# Patient Record
Sex: FEMALE | Race: WHITE | NOT HISPANIC OR LATINO | Employment: FULL TIME | URBAN - METROPOLITAN AREA
[De-identification: names, ages, dates, MRNs, and addresses within clinical notes are randomized per-mention and may not be internally consistent; named-entity substitution may affect disease eponyms.]

---

## 2023-11-28 ENCOUNTER — PATIENT OUTREACH (OUTPATIENT)
Dept: HEMATOLOGY/ONCOLOGY | Facility: HOSPITAL | Age: 23
End: 2023-11-28
Payer: COMMERCIAL

## 2023-11-28 NOTE — PROGRESS NOTES
Patient called asking to see Dr. Stephenson. She said that her cousin Shelby Richardson sees him. Patient said that she is an Ashkenazi Zoroastrian with a very low ferritin. She states that it trends downward. She also said that it is not due to blood loss because she has not had a period in one year. Trying to obtain records from [facility].

## 2023-12-06 ENCOUNTER — OFFICE VISIT (OUTPATIENT)
Dept: HEMATOLOGY/ONCOLOGY | Facility: HOSPITAL | Age: 23
End: 2023-12-06
Payer: COMMERCIAL

## 2023-12-06 ENCOUNTER — LAB (OUTPATIENT)
Dept: LAB | Facility: HOSPITAL | Age: 23
End: 2023-12-06
Payer: COMMERCIAL

## 2023-12-06 VITALS
TEMPERATURE: 97.9 F | RESPIRATION RATE: 16 BRPM | WEIGHT: 154.76 LBS | HEART RATE: 98 BPM | SYSTOLIC BLOOD PRESSURE: 118 MMHG | OXYGEN SATURATION: 100 % | BODY MASS INDEX: 26.42 KG/M2 | HEIGHT: 64 IN | DIASTOLIC BLOOD PRESSURE: 74 MMHG

## 2023-12-06 DIAGNOSIS — D50.9 IRON DEFICIENCY ANEMIA, UNSPECIFIED IRON DEFICIENCY ANEMIA TYPE: ICD-10-CM

## 2023-12-06 DIAGNOSIS — D50.9 IRON DEFICIENCY ANEMIA, UNSPECIFIED IRON DEFICIENCY ANEMIA TYPE: Primary | ICD-10-CM

## 2023-12-06 LAB
ALBUMIN SERPL BCP-MCNC: 4.8 G/DL (ref 3.4–5)
ALP SERPL-CCNC: 63 U/L (ref 33–110)
ALT SERPL W P-5'-P-CCNC: 19 U/L (ref 7–45)
ANION GAP SERPL CALC-SCNC: 13 MMOL/L (ref 10–20)
AST SERPL W P-5'-P-CCNC: 24 U/L (ref 9–39)
BASOPHILS # BLD AUTO: 0.04 X10*3/UL (ref 0–0.1)
BASOPHILS NFR BLD AUTO: 0.7 %
BILIRUB DIRECT SERPL-MCNC: 0.1 MG/DL (ref 0–0.3)
BILIRUB SERPL-MCNC: 0.6 MG/DL (ref 0–1.2)
BUN SERPL-MCNC: 12 MG/DL (ref 6–23)
CALCIUM SERPL-MCNC: 9.8 MG/DL (ref 8.6–10.6)
CHLORIDE SERPL-SCNC: 102 MMOL/L (ref 98–107)
CO2 SERPL-SCNC: 28 MMOL/L (ref 21–32)
CREAT SERPL-MCNC: 0.84 MG/DL (ref 0.5–1.05)
EOSINOPHIL # BLD AUTO: 0.05 X10*3/UL (ref 0–0.7)
EOSINOPHIL NFR BLD AUTO: 0.9 %
ERYTHROCYTE [DISTWIDTH] IN BLOOD BY AUTOMATED COUNT: 13.5 % (ref 11.5–14.5)
ERYTHROCYTE [SEDIMENTATION RATE] IN BLOOD BY WESTERGREN METHOD: 3 MM/H (ref 0–20)
FERRITIN SERPL-MCNC: 8 NG/ML (ref 8–150)
GFR SERPL CREATININE-BSD FRML MDRD: >90 ML/MIN/1.73M*2
GLUCOSE SERPL-MCNC: 80 MG/DL (ref 74–99)
HCT VFR BLD AUTO: 33.6 % (ref 36–46)
HGB BLD-MCNC: 10.5 G/DL (ref 12–16)
HGB RETIC QN: 27 PG (ref 28–38)
IMM GRANULOCYTES # BLD AUTO: 0.01 X10*3/UL (ref 0–0.7)
IMM GRANULOCYTES NFR BLD AUTO: 0.2 % (ref 0–0.9)
IMMATURE RETIC FRACTION: 9.4 %
IRON SATN MFR SERPL: ABNORMAL %
IRON SERPL-MCNC: 31 UG/DL (ref 35–150)
LDH SERPL L TO P-CCNC: 192 U/L (ref 84–246)
LYMPHOCYTES # BLD AUTO: 1.59 X10*3/UL (ref 1.2–4.8)
LYMPHOCYTES NFR BLD AUTO: 28.8 %
MCH RBC QN AUTO: 26.5 PG (ref 26–34)
MCHC RBC AUTO-ENTMCNC: 31.3 G/DL (ref 32–36)
MCV RBC AUTO: 85 FL (ref 80–100)
MONOCYTES # BLD AUTO: 0.32 X10*3/UL (ref 0.1–1)
MONOCYTES NFR BLD AUTO: 5.8 %
NEUTROPHILS # BLD AUTO: 3.51 X10*3/UL (ref 1.2–7.7)
NEUTROPHILS NFR BLD AUTO: 63.6 %
NRBC BLD-RTO: 0 /100 WBCS (ref 0–0)
PLATELET # BLD AUTO: 238 X10*3/UL (ref 150–450)
POTASSIUM SERPL-SCNC: 4.1 MMOL/L (ref 3.5–5.3)
PROT SERPL-MCNC: 7.6 G/DL (ref 6.4–8.2)
RBC # BLD AUTO: 3.96 X10*6/UL (ref 4–5.2)
RETICS #: 0.06 X10*6/UL (ref 0.02–0.08)
RETICS/RBC NFR AUTO: 1.4 % (ref 0.5–2)
SODIUM SERPL-SCNC: 139 MMOL/L (ref 136–145)
TIBC SERPL-MCNC: ABNORMAL UG/DL
TSH SERPL-ACNC: 1.89 MIU/L (ref 0.44–3.98)
UIBC SERPL-MCNC: >450 UG/DL (ref 110–370)
WBC # BLD AUTO: 5.5 X10*3/UL (ref 4.4–11.3)

## 2023-12-06 PROCEDURE — 85045 AUTOMATED RETICULOCYTE COUNT: CPT

## 2023-12-06 PROCEDURE — 80053 COMPREHEN METABOLIC PANEL: CPT

## 2023-12-06 PROCEDURE — 85025 COMPLETE CBC W/AUTO DIFF WBC: CPT

## 2023-12-06 PROCEDURE — 83615 LACTATE (LD) (LDH) ENZYME: CPT

## 2023-12-06 PROCEDURE — 83550 IRON BINDING TEST: CPT

## 2023-12-06 PROCEDURE — 1036F TOBACCO NON-USER: CPT | Performed by: INTERNAL MEDICINE

## 2023-12-06 PROCEDURE — 83540 ASSAY OF IRON: CPT

## 2023-12-06 PROCEDURE — 99204 OFFICE O/P NEW MOD 45 MIN: CPT | Performed by: INTERNAL MEDICINE

## 2023-12-06 PROCEDURE — 82728 ASSAY OF FERRITIN: CPT

## 2023-12-06 PROCEDURE — 85652 RBC SED RATE AUTOMATED: CPT

## 2023-12-06 PROCEDURE — 83020 HEMOGLOBIN ELECTROPHORESIS: CPT | Performed by: PATHOLOGY

## 2023-12-06 PROCEDURE — 99214 OFFICE O/P EST MOD 30 MIN: CPT | Mod: 25 | Performed by: INTERNAL MEDICINE

## 2023-12-06 PROCEDURE — 84443 ASSAY THYROID STIM HORMONE: CPT

## 2023-12-06 PROCEDURE — 82248 BILIRUBIN DIRECT: CPT

## 2023-12-06 PROCEDURE — 36415 COLL VENOUS BLD VENIPUNCTURE: CPT

## 2023-12-06 PROCEDURE — 83010 ASSAY OF HAPTOGLOBIN QUANT: CPT

## 2023-12-06 PROCEDURE — 83021 HEMOGLOBIN CHROMOTOGRAPHY: CPT

## 2023-12-06 RX ORDER — ONDANSETRON HYDROCHLORIDE 8 MG/1
8 TABLET, FILM COATED ORAL EVERY 8 HOURS PRN
COMMUNITY

## 2023-12-06 RX ORDER — PYRIDOSTIGMINE BROMIDE 60 MG/1
60 TABLET ORAL
COMMUNITY

## 2023-12-06 RX ORDER — DEXLANSOPRAZOLE 60 MG/1
60 CAPSULE, DELAYED RELEASE ORAL DAILY
COMMUNITY

## 2023-12-06 RX ORDER — AMITRIPTYLINE HYDROCHLORIDE 25 MG/1
25 TABLET, FILM COATED ORAL NIGHTLY
COMMUNITY

## 2023-12-06 RX ORDER — PROMETHAZINE HYDROCHLORIDE 25 MG/1
25 TABLET ORAL EVERY 6 HOURS PRN
COMMUNITY

## 2023-12-06 ASSESSMENT — COLUMBIA-SUICIDE SEVERITY RATING SCALE - C-SSRS
1. IN THE PAST MONTH, HAVE YOU WISHED YOU WERE DEAD OR WISHED YOU COULD GO TO SLEEP AND NOT WAKE UP?: NO
2. HAVE YOU ACTUALLY HAD ANY THOUGHTS OF KILLING YOURSELF?: NO
6. HAVE YOU EVER DONE ANYTHING, STARTED TO DO ANYTHING, OR PREPARED TO DO ANYTHING TO END YOUR LIFE?: NO

## 2023-12-06 ASSESSMENT — PATIENT HEALTH QUESTIONNAIRE - PHQ9
1. LITTLE INTEREST OR PLEASURE IN DOING THINGS: NOT AT ALL
SUM OF ALL RESPONSES TO PHQ9 QUESTIONS 1 AND 2: 0
2. FEELING DOWN, DEPRESSED OR HOPELESS: NOT AT ALL

## 2023-12-06 ASSESSMENT — ENCOUNTER SYMPTOMS
LOSS OF SENSATION IN FEET: 0
OCCASIONAL FEELINGS OF UNSTEADINESS: 0
DEPRESSION: 0

## 2023-12-06 ASSESSMENT — PAIN SCALES - GENERAL: PAINLEVEL: 0-NO PAIN

## 2023-12-06 NOTE — PROGRESS NOTES
Hematology office visit      Reason for visit : Evaluation for iron deficiency anaemia and low ferritin     HPI:   Herminia comes in today for evaluation of anaemia which she says was first noted in 2018  at that time she was treated with one / two infusion of IV iron which increased her ferritin levels however she was concerned her TIBC never normalized . Initially her iron deficiency was attributed to blood loss from menses. However,  she said her menses was never heavy , menarche at age 11 and periods were always irregular . She was started on Depot to normalize her periods, and then she had irregular spotting , when it was time to replace her Depot she developed amenorrhea and has not had  a period for a year. Sees GYN and even had progesterone challenge which induced withdrawal bleed , but after this she did not resume normal menses .    She has also seen GI , recent EGD was 2 days ago , has had many C scopes due to family h/o crohn's in her sister which did not show a GI source of bleed. She also said she was briefly on oral iron but could not tolerate  due to GI side effects which she chose not to elaborate , just said her underlying POTS made it difficult for hr to be on oral iron . She also sees Neurology , rheumatology and Endocrinology.     Her main concern today is that her 3rd cousin who is also a pt of Dr. Stephenson had been told she may have rare genetic blood disorder and she had been tested for it , Herminia believes she may also have a similar genetic disorder due to her being of Ashkenazi - [place] descent  and she would like to know what is causing her low ferritin level despite her not having heavy menses or GI bleed.    Admits to having excessive fatigue & brain fog that she attributes to low ferritin . Denied Pica .     No family history of anaemia , mother did have hysterectomy from fibroids and menorrhagia secondary to that .    Employed , non smoker , drinks alcohol 1-2 jaycee on weekdays ,  approx 4-5 over weekends, no recreational drug use      Past Medical History:   Diagnosis Date    Encounter for general adult medical examination without abnormal findings     Encounter for preventive health examination    Personal history of other diseases of the digestive system 03/31/2017    History of esophageal reflux    Postural orthostatic tachycardia syndrome (POTS)     POTS (postural orthostatic tachycardia syndrome)         Past Surgical History:   Procedure Laterality Date    APPENDECTOMY  02/23/2017    Appendectomy    CHOLECYSTECTOMY  03/31/2017    Cholecystectomy       No family history on file.    Social History     Socioeconomic History    Marital status: Single     Spouse name: Not on file    Number of children: Not on file    Years of education: Not on file    Highest education level: Not on file   Occupational History    Not on file   Tobacco Use    Smoking status: Never     Passive exposure: Never    Smokeless tobacco: Never   Substance and Sexual Activity    Alcohol use: Not Currently    Drug use: Never    Sexual activity: Not on file   Other Topics Concern    Not on file   Social History Narrative    Not on file     Social Determinants of Health     Financial Resource Strain: Not on file   Food Insecurity: Not on file   Transportation Needs: Not on file   Physical Activity: Not on file   Stress: Not on file   Social Connections: Not on file   Intimate Partner Violence: Not on file   Housing Stability: Not on file         No Known Allergies    Medications  Current Outpatient Medications   Medication Sig Dispense Refill    amitriptyline (Elavil) 25 mg tablet Take 1 tablet (25 mg) by mouth once daily at bedtime.      dexlansoprazole (Dexilant) 60 mg DR capsule Take 1 capsule (60 mg) by mouth once daily. Do not crush or chew.      ondansetron (Zofran) 8 mg tablet Take 1 tablet (8 mg) by mouth every 8 hours if needed for nausea or vomiting.      promethazine (Phenergan) 25 mg tablet Take 1 tablet (25  mg) by mouth every 6 hours if needed for nausea or vomiting. 1/2 tablet      pyridostigmine (Mestinon) 60 mg tablet Take 1 tablet (60 mg) by mouth. 2 to 3 times a day       No current facility-administered medications for this visit.         Review of Systems    Constitutional: Negative for fever, chills, anorexia, weight loss, has severe malaise & wt gain     ENMT: Negative for nasal discharge, congestion, ear pain, mouth pain, throat pain, bleeding      Respiratory: Negative for cough, hemoptysis, wheezing, shortness of breath     Cardiac: Negative for chest pain, dyspnea on exertion, orthopnea, palpitations, syncope     Gastrointestinal: Negative for nausea, vomiting, diarrhea, constipation, abdominal pain,     Genitourinary: Negative for discharge, dysuria, flank pain, frequency, hematuria, has amenorrhea      Musculoskeletal: Negative for decreased ROM, pain, swelling, stiffness     Neurological: Negative for dizziness, confusion, gets occ headache, no seizures, syncope, weakness     Psychiatric: Negative for mood changes, anxiety, hallucinations, sleep changes, suicidal ideas     Skin: Negative for itching, rash, ulcer     Endocrine: Negative for heat intolerance, cold intolerance, excessive sweating, polyuria, excess thirst     Hematologic/Lymph: Negative for anemia, bruising, easy bleeding, night sweats, petechiae, history of DVT/PE or cancer     Allergic/Immunologic: Negative for anaphylaxis, itchy/ teary eyes, itching, sneezing, swelling     Physical Exam:  Vitals:    12/06/23 1517   BP: 118/74   Pulse: 98   Resp: 16   Temp: 36.6 °C (97.9 °F)   SpO2: 100%        General: Comfortable , no acute distress  Eyes: No conjunctival pallor / no scleral icterus   ENT: Oropharynx normal. No bleeding, petechiae, no glossitis    Cardiovascular: Regular rate & rhythm , S1/S2, no murmurs, peripheral  pulses +2, no edema of extremities  Pulmonary: CTAB, no respiratory distress. No wheezes, rales, or ronchi  Abdomen: +BS,  soft, non-tender, no hepatosplenomegaly   Extremities: No wounds, or contusions  Lymphatics: No cervical, axillary , epitrochlear or inguinal  lymphadenopathy   Skin- No rash, petechia or ecchymosis, , suffused &  warm    Neuro: Alert/oriented x3, no focal motor or sensory deficits    Labs:    Component  Ref Range & Units 17:04   WBC  4.4 - 11.3 x10*3/uL 5.5   nRBC  0.0 - 0.0 /100 WBCs 0.0   RBC  4.00 - 5.20 x10*6/uL 3.96 Low    Hemoglobin  12.0 - 16.0 g/dL 10.5 Low    Hematocrit  36.0 - 46.0 % 33.6 Low    MCV  80 - 100 fL 85   MCH  26.0 - 34.0 pg 26.5   MCHC  32.0 - 36.0 g/dL 31.3 Low    RDW  11.5 - 14.5 % 13.5   Platelets  150 - 450 x10*3/uL 238   Neutrophils %  40.0 - 80.0 % 63.6   Immature Granulocytes %, Automated  0.0 - 0.9 % 0.2   Comment: Immature Granulocyte Count (IG) includes promyelocytes, myelocytes and metamyelocytes but does not include bands. Percent differential counts (%) should be interpreted in the context of the absolute cell counts (cells/UL).   Lymphocytes %  13.0 - 44.0 % 28.8   Monocytes %  2.0 - 10.0 % 5.8   Eosinophils %  0.0 - 6.0 % 0.9   Basophils %  0.0 - 2.0 % 0.7   Neutrophils Absolute  1.20 - 7.70 x10*3/uL 3.51   Comment: Percent differential counts (%) should be interpreted in the context of the absolute cell counts (cells/uL).   Immature Granulocytes Absolute, Automated  0.00 - 0.70 x10*3/uL 0.01   Lymphocytes Absolute  1.20 - 4.80 x10*3/uL 1.59   Monocytes Absolute  0.10 - 1.00 x10*3/uL 0.32   Eosinophils Absolute  0.00 - 0.70 x10*3/uL 0.05   Basophils Absolute  0.00 - 0.10 x10*3/uL 0.04       Component  Ref Range & Units 17:04   Retic %  0.5 - 2.0 % 1.4   Retic Absolute  0.018 - 0.083 x10*6/uL 0.057   Reticulocyte Hemoglobin  28 - 38 pg 27 Low    Immature Retic fraction  <=16.0 % 9.4       Component  Ref Range & Units 17:04   Ferritin  8 - 150 ng/mL 8     Component  Ref Range & Units 17:04   Iron  35 - 150 ug/dL 31 Low    UIBC  110 - 370 ug/dL >450 High    TIBC    Comment: One or  more of the analytes used in this calculation is outside of the analytical measurement range.   % Saturation    Comment: One or more analytes used in this calculation is outside of the analytical measurement range. Calculation cannot be performed.     Component  Ref Range & Units 17:04   Sedimentation Rate  0 - 20 mm/h 3     Component  Ref Range & Units 17:04   Glucose  74 - 99 mg/dL 80   Sodium  136 - 145 mmol/L 139   Potassium  3.5 - 5.3 mmol/L 4.1   Chloride  98 - 107 mmol/L 102   Bicarbonate  21 - 32 mmol/L 28   Anion Gap  10 - 20 mmol/L 13   Urea Nitrogen  6 - 23 mg/dL 12   Creatinine  0.50 - 1.05 mg/dL 0.84   eGFR  >60 mL/min/1.73m*2 >90   Comment: Calculations of estimated GFR are performed using the 2021 CKD-EPI Study Refit equation without the race variable for the IDMS-Traceable creatinine methods.  https://jasn.asnjournals.org/content/early/2021/09/22/ASN.4722388868   Calcium  8.6 - 10.6 mg/dL 9.8   Albumin  3.4 - 5.0 g/dL 4.8   Alkaline Phosphatase  33 - 110 U/L 63   Total Protein  6.4 - 8.2 g/dL 7.6   AST  9 - 39 U/L 24   Bilirubin, Total  0.0 - 1.2 mg/dL 0.6   ALT  7 - 45 U/L 19     Ref Range & Units 17:04   Bilirubin, Direct  0.0 - 0.3 mg/dL 0.1     Component  Ref Range & Units 17:04   LDH  84 - 246 U/L 192     Ref Range & Units 17:04   Thyroid Stimulating Hormone  0.44 - 3.98 mIU/L 1.89     Assessment/Plan:    Herminia Paz is a 23 y.o. female presenting for  evaluation of Iron deficiency and hypoferritinemia.  She is iron deficient and mildly and anemic.   I looked at her peripheral blood smear and it was hardly hypochromic and not clearly microcytic.  However, the patient is iron deficient and needs replacement.      I plan to call the patient and ask that she take oral iron.  If successful, she should correct in 4-8 weeks.  If unsuccessful in taking oral iron we can discuss IV iron and its risks (1-2% anaphylaxis).      Iron deficiency / low ferritin level   Does not have menses, no GI source of  blood loss , unclear why she has low ferritin levels . Not taking oral iron , had IV iron once and tolerated it well . Presently Hb is in the 11's , she does have fatigue. We would need to repeat some of her labs today  and consider oral iron supplementation     PLAN   CBCD, retic slide review   Ferritin , iron studies , hb identification   Hapto, LDH , Sed rate , TSH, CMP , D Dread     Follow up in 4 weeks with a phone visit     Seen and staffed with Dr. Stephenson  who is in agreement with the plan .    Dr. Pebbles Neves MD FACP  PGY-6,  Hematology & Oncology Fellow   [facility]  [id number] [id number] [phone]

## 2023-12-07 LAB
HAPTOGLOB SERPL-MCNC: 112 MG/DL (ref 37–246)
HEMOGLOBIN A2: 2.3 % (ref 2–3.5)
HEMOGLOBIN A: 97.4 % (ref 95.8–98)
HEMOGLOBIN F: 0.3 % (ref 0–2)
HEMOGLOBIN IDENTIFICATION INTERPRETATION: NORMAL
PATH REVIEW-HGB IDENTIFICATION: NORMAL

## 2024-01-17 ENCOUNTER — APPOINTMENT (OUTPATIENT)
Dept: HEMATOLOGY/ONCOLOGY | Facility: HOSPITAL | Age: 24
End: 2024-01-17
Payer: COMMERCIAL